# Patient Record
Sex: FEMALE | Race: AMERICAN INDIAN OR ALASKA NATIVE | NOT HISPANIC OR LATINO | Employment: STUDENT | URBAN - METROPOLITAN AREA
[De-identification: names, ages, dates, MRNs, and addresses within clinical notes are randomized per-mention and may not be internally consistent; named-entity substitution may affect disease eponyms.]

---

## 2023-04-21 ENCOUNTER — TRANSFERRED RECORDS (OUTPATIENT)
Dept: HEALTH INFORMATION MANAGEMENT | Facility: CLINIC | Age: 23
End: 2023-04-21
Payer: COMMERCIAL

## 2023-04-24 ENCOUNTER — TELEPHONE (OUTPATIENT)
Dept: OBGYN | Facility: CLINIC | Age: 23
End: 2023-04-24
Payer: COMMERCIAL

## 2023-04-24 DIAGNOSIS — Z33.2 LEGAL TERMINATION OF PREGNANCY: Primary | ICD-10-CM

## 2023-04-24 NOTE — TELEPHONE ENCOUNTER
"Called and spoke with patient regarding termination referral. Patient confirmed she is seeking a D&E. Voiced concern and anxiety over process, as past terminations were mentally, emotionally and physically traumatizing.     Patient's first termination was around 23 weeks pregnant, and she did not know she was pregnant. She states she felt everything for both parts of her procedure, the laminaria placement and D&E procedure itself. In 2018, she found out she was pregnant again and elected to proceed with a medication termination to avoid the trauma from her first experience. The medication termination was complicated by \"vomiting, chills, severe pain where [she] became unconscious multiple times and large blood loss that resulted in [her] being evaluated emergently in an ED.\" Patient states her experience in the ED was traumatizing as the staff were prejudice towards her (racially and for pursuing an ) and made her feel as if she deserved the complications that were happening to her.    Validated patient's fears and anxieties and informed patient that we will work to make this a therpauetic experience for her. Also informed patient that staff here are supportive of women's health and reproductive rights. Patient thankfull and appreciative of clinic's support.     Patient had questions regarding financial coverage. Informed patient that an insurance check will be generated by the clinic, and if not covered, a cost estimate will be created. Informed patient that financial resources are available and will be provided to her, to contact, once the  has completed check insurance.     Answered remaining questions. Provided patient with clinic's emergency line for any questions/concerns/requests.     E-mail sent to [facility] to verify insurance coverage.  "

## 2023-04-24 NOTE — TELEPHONE ENCOUNTER
Referral received from [facility] for D&E.  She is being referred to [facility] because she requires additional sedation than what can be given at [facility]  Gestational age 14+6  DAMIAN 10/14/23  Medical records have been received    An email has been sent to [facility]/financial counselor to verify insurance

## 2023-04-24 NOTE — LETTER
Day of Dilators: Wednesday, May 10th    Your dilators are scheduled at the [facility] at [address].  You can park at the [address], take the skyway across to the building, and take the elevator to the third floor.     You are scheduled at 2:30pm.  Please arrive early at 2:15pm and check in with the .      You will not be sedated this day, so you are able to drive yourself, however many people find it helpful to have a support person and .  You may take 600-800mg of ibuprofen one hour prior to this appointment with food to help ease cramping.  Your cervix will be injected with a numbing medication before the dilators are placed.    After the insertion procedure, you will head to the pharmacy on the 2nd floor of the same building to  two prescriptions: Flagyl and Cytotec.  Flagyl is an antibiotic- please take it with dinner the night before surgery.  The Cytotec you will need the day of surgery.     You will also be given a special soap called Hibiclens.  Please wash your body with this soap the night before and the morning of surgery.  This helps reduce the bacterial load in the operating room.   If you wash and condition your hair, please wash your body with the soap AFTER rinsing your hair.     You will be given a phone number to call if you have any concerns on the night between dilator placement and surgery.     Day of Surgery: Thursday, May 11th    Your surgery is scheduled at [facility].  [facility].  [address] [place] [address].  If parking is needed please park in the [address].  If you are unsure how to get to the hospital - search [employer] maps for [facility] [place].    Just stop at the  and security will tell you where you need to go for your surgery.    Your surgery is scheduled at 1:45pm.  Arrive at the hospital 2 hours before your surgery at 11:45am.    Please do not eat or drink  after 3:45am the day of surgery.  You may have sips of clear liquids (water, black coffee, Gatorade) up to 9:45am.  If you have prescription medications that you take everyday, you can take those with a sip of water.     Cytotec is the other medication you picked up from the pharmacy.  This medication should be placed between your cheek and gum in your lower jaw, 2 hours before surgery.  You can place it in your cheeks as you arrive to the hospital.    A responsible adult (18 years or older) will need to drive you home after surgery and stay with you for 24 hours.

## 2023-04-24 NOTE — TELEPHONE ENCOUNTER
"E-mail response from Shikha:     \"Upon checking on this patients plan document on [employer], this is a non-covered for elective.  Below states that only covers the safeguard the life of the mother.    The following exclusions and limitations apply to Expenses Incurred by all Covered Persons:    1. .    Charges for  unless Medically Necessary to safeguard the life of the mother. This Plan does cover treatment of complications that arise after an , whether or not the  was Medically Necessary.\".     Cost of care estimate generated.       "

## 2023-04-25 DIAGNOSIS — Z33.2 TERMINATION OF PREGNANCY (FETUS): Primary | ICD-10-CM

## 2023-04-25 RX ORDER — ACETAMINOPHEN 325 MG/1
975 TABLET ORAL ONCE
Status: CANCELLED | OUTPATIENT
Start: 2023-04-25 | End: 2023-04-25

## 2023-04-25 NOTE — PROGRESS NOTES
Referral from [facility]. Donna Spain is a  at 15w3d with DAMIAN of 10/14/2023 by 14w6d US referred to [facility] for termination due to GA.    Women's Right to Know completion date: N/A    History of vaginal deliveries/ deliveries: None    Blood type: Unknown    Mifepristone: no    Misoprostol: yes    Laminaria: yes: if procedure done > 16 weeks     Chromosome testing: no    Remains: hospital disposition    Desires memento box/footprints: no    Gricel Teixeira MD

## 2023-04-28 NOTE — TELEPHONE ENCOUNTER
Received call from Syl at [facility].  Per Syl, patient had reached out and made an appointment with them in a few days.  She has been to [facility] before and has trauma from previous terminations, which is why she was referred to [facility].  Syl believes the procedure will be safer/better at [facility] for Donna, given her trauma and anxiety.      Syl stated she would call Donna this morning and encourage her to reach out to the financial resources she was given and move forward with termination at [facility].     I stated I would call her this afternoon to check in.

## 2023-05-01 ENCOUNTER — HOSPITAL ENCOUNTER (OUTPATIENT)
Facility: CLINIC | Age: 23
End: 2023-05-01
Attending: OBSTETRICS & GYNECOLOGY | Admitting: OBSTETRICS & GYNECOLOGY
Payer: COMMERCIAL

## 2023-05-01 NOTE — TELEPHONE ENCOUNTER
Called Donna to check in re: funding.     She is scheduled with [facility] on , and plans to keep this appointment in case she does not receive more funding.     [employer] has pledged $1,500.  She has calls out to [employer], who offered $400.  Is waiting to hear back from [employer].      Discussed that she is scheduled with  5/10 and .      Plan is for me to call Donna tomorrow and Wednesday afternoons to check in with her.  If she does not receive adequate funding by Wednesday afternoon, she will cancel with us and go to [facility].      I asked if she will be okay with this, given her previous trauma, and she stated she would rather go through this procedure at [facility] than bring a baby into the world that she cannot afford.

## 2023-05-03 NOTE — TELEPHONE ENCOUNTER
Called Donna to check in.      She has spoken with Syl from [facility].  They have postponed her appointment there until she sees if she has enough funding to pursue with us.  If not, she will use [facility] as Plan B.     She has called 10 different places for funding.  [employer] is pledging $1,500.    [employer] is pledging $1,000.    [facility]  [facility]  Fund  [place]   NAF: Is awaiting call from     Sent email to Anup Ramirez.     Donna declines social work involvement at this time, does not want mementoes, and wants the hospital to handle remains.  Sent FYI to social work.     Discussed what to expect for both days.  Donna has had dilators placed before and states it was painful for her in the past.  Encouraged her to take 800mg ibuprofen prior. Generated and sent letter with all instructions.      Plan is to call Donna on Monday to check in re: funding.

## 2023-05-05 NOTE — TELEPHONE ENCOUNTER
Received a call from [employer] in [place], [place], which is a volunteer-staffed fund.  They are pledging $1,000 for Donna's procedure.  They would like an invoice sent to them at [email or web address].  Nathalie can be called with questions at [phone].      Email sent to Anup Ramirez.

## 2023-05-08 ENCOUNTER — MYC MEDICAL ADVICE (OUTPATIENT)
Dept: SURGERY | Facility: CLINIC | Age: 23
End: 2023-05-08
Payer: COMMERCIAL

## 2023-05-08 RX ORDER — METRONIDAZOLE 500 MG/1
500 TABLET ORAL ONCE
Qty: 1 TABLET | Refills: 0 | Status: SHIPPED | OUTPATIENT
Start: 2023-05-08 | End: 2023-05-08

## 2023-05-08 RX ORDER — MISOPROSTOL 200 UG/1
TABLET ORAL
Qty: 2 TABLET | Refills: 0 | Status: SHIPPED | OUTPATIENT
Start: 2023-05-08

## 2023-05-08 NOTE — TELEPHONE ENCOUNTER
Contacted Donna.  She states that she has secured funding for full cost of the procedure.  Medications sent to [facility].    email to Anup to confirm coverage

## 2023-05-10 ENCOUNTER — ALLIED HEALTH/NURSE VISIT (OUTPATIENT)
Dept: OBGYN | Facility: CLINIC | Age: 23
End: 2023-05-10
Attending: OBSTETRICS & GYNECOLOGY
Payer: COMMERCIAL

## 2023-05-10 VITALS
WEIGHT: 102 LBS | SYSTOLIC BLOOD PRESSURE: 126 MMHG | DIASTOLIC BLOOD PRESSURE: 80 MMHG | HEART RATE: 118 BPM | TEMPERATURE: 98.9 F

## 2023-05-10 DIAGNOSIS — Z33.2 TERMINATION OF PREGNANCY (FETUS): Primary | ICD-10-CM

## 2023-05-10 DIAGNOSIS — Z33.2 LEGAL TERMINATION OF PREGNANCY: Primary | ICD-10-CM

## 2023-05-10 PROCEDURE — G0463 HOSPITAL OUTPT CLINIC VISIT: HCPCS | Performed by: OBSTETRICS & GYNECOLOGY

## 2023-05-10 PROCEDURE — 99203 OFFICE O/P NEW LOW 30 MIN: CPT | Performed by: OBSTETRICS & GYNECOLOGY

## 2023-05-10 RX ORDER — HYDROMORPHONE HYDROCHLORIDE 1 MG/ML
0.2 INJECTION, SOLUTION INTRAMUSCULAR; INTRAVENOUS; SUBCUTANEOUS EVERY 5 MIN PRN
Status: CANCELLED | OUTPATIENT
Start: 2023-05-10

## 2023-05-10 RX ORDER — METRONIDAZOLE 500 MG/1
500 TABLET ORAL ONCE
Qty: 1 TABLET | Refills: 0 | Status: SHIPPED | OUTPATIENT
Start: 2023-05-10 | End: 2023-05-10

## 2023-05-10 RX ORDER — ONDANSETRON 2 MG/ML
4 INJECTION INTRAMUSCULAR; INTRAVENOUS EVERY 30 MIN PRN
Status: CANCELLED | OUTPATIENT
Start: 2023-05-10

## 2023-05-10 RX ORDER — SODIUM CHLORIDE, SODIUM LACTATE, POTASSIUM CHLORIDE, CALCIUM CHLORIDE 600; 310; 30; 20 MG/100ML; MG/100ML; MG/100ML; MG/100ML
INJECTION, SOLUTION INTRAVENOUS CONTINUOUS
Status: CANCELLED | OUTPATIENT
Start: 2023-05-10

## 2023-05-10 RX ORDER — MIFEPRISTONE 200 MG/1
200 TABLET ORAL ONCE
Status: CANCELLED | OUTPATIENT
Start: 2023-05-10 | End: 2023-05-10

## 2023-05-10 RX ORDER — FENTANYL CITRATE 50 UG/ML
50 INJECTION, SOLUTION INTRAMUSCULAR; INTRAVENOUS EVERY 5 MIN PRN
Status: CANCELLED | OUTPATIENT
Start: 2023-05-10

## 2023-05-10 RX ORDER — FENTANYL CITRATE 50 UG/ML
25 INJECTION, SOLUTION INTRAMUSCULAR; INTRAVENOUS EVERY 5 MIN PRN
Status: CANCELLED | OUTPATIENT
Start: 2023-05-10

## 2023-05-10 RX ORDER — HYDROMORPHONE HYDROCHLORIDE 1 MG/ML
0.4 INJECTION, SOLUTION INTRAMUSCULAR; INTRAVENOUS; SUBCUTANEOUS EVERY 5 MIN PRN
Status: CANCELLED | OUTPATIENT
Start: 2023-05-10

## 2023-05-10 RX ORDER — ONDANSETRON 4 MG/1
4 TABLET, ORALLY DISINTEGRATING ORAL EVERY 30 MIN PRN
Status: CANCELLED | OUTPATIENT
Start: 2023-05-10

## 2023-05-10 ASSESSMENT — ANXIETY QUESTIONNAIRES
5. BEING SO RESTLESS THAT IT IS HARD TO SIT STILL: NOT AT ALL
7. FEELING AFRAID AS IF SOMETHING AWFUL MIGHT HAPPEN: NOT AT ALL
GAD7 TOTAL SCORE: 1
2. NOT BEING ABLE TO STOP OR CONTROL WORRYING: NOT AT ALL
GAD7 TOTAL SCORE: 1
1. FEELING NERVOUS, ANXIOUS, OR ON EDGE: SEVERAL DAYS
6. BECOMING EASILY ANNOYED OR IRRITABLE: NOT AT ALL
3. WORRYING TOO MUCH ABOUT DIFFERENT THINGS: NOT AT ALL

## 2023-05-10 ASSESSMENT — PATIENT HEALTH QUESTIONNAIRE - PHQ9
SUM OF ALL RESPONSES TO PHQ QUESTIONS 1-9: 1
5. POOR APPETITE OR OVEREATING: NOT AT ALL

## 2023-05-10 NOTE — PROGRESS NOTES
Patient and partner/support person, Junterri, present to clinic for laminaria placement.  Tavian stayed in the waiting room. BP and temp normal, tachycardic at 118. Discussed laminaria procedure and NPO/clear liquid status, instructions for arrival to hospital, use of antibacterial body wash, and prescribed medications. Gave patient post procedure and post surgery information. Reviewed emergency instructions and phone number. All questions answered. Consented for procedure by Dr. Moses, See provider note. Mifepristone not needed.     Donna stated, when she came in, that she has been having contraction-like pain today and an urge to push, like she needs to have a bowel movement, as well as wet underwear.  She states she took misoprostal 400mcg at [facility] on 4/21 and felt an urge to push and had some spotting.     Dr. Moses placed a speculum, and patient was unable to tolerate procedure thereafter.  I attempted to re-schedule her for LAMs tomorrow and D+E Friday, but no surgeons available for Friday.  I told her our surgery scheduler would reach out tomorrow to discuss options.

## 2023-05-10 NOTE — PROGRESS NOTES
23yo  at 17+5 here for laminaria insertion and preop H and P for  D and E tomorrow.   Reviewed expectations, options, questions.   Having lots of pressure.     Past Surgical History:   Procedure Laterality Date     INDUCED       times 2, bled heavy with 7 week procedure     OB History    Para Term  AB Living   3 0 0 0 2 0   SAB IAB Ectopic Multiple Live Births   0 0 0 0 0      # Outcome Date GA Lbr Remberto/2nd Weight Sex Delivery Anes PTL Lv   3             2 AB            1 AB                  Appears well  Lungs CTA bilaterally  CV RRR  Abdomen  EG wnl  Cervix nulliparous external os appears closed   Bimanual c/w stated GA  LE without significant edema nor erythema    Labs pending (ABO, CBC)  Ul[facility]ound reviewed [facility] for dating    Procedure:  After affirmation of consent was signed the patient was positioned  The cervix was visualized with a Henry speculum   Patient could not tolerate the procedure from that point and requested singleton insertion under anesthesia.   EBL none    A/P:  Will work on rescheduling.     Denisa Moses MD

## 2023-05-10 NOTE — TELEPHONE ENCOUNTER
Received call from patient. Patient reports she cannot come to appointment today unless she knows cost of mifepristone, misoprostol, and flagyl as it need to be under $100. Patient reports she needs to know by 11:00 as that is when she needs to leave for her appointment, and is inquiring about alternative dates if she cannot come today. E-mail sent to financial team, spoke with [employer] team and [facility].     Costs given:  Mifepristone: $64.60 before insurance  Total for cytotec and flagyl together: $7.76    Called patient back to let her know costs. Spoke with Dr. Moses who reports patient does not need mifepristone. Patient stated she struggles financially and she will speak with her boyfriend and call back if she will still come in today. Let patient know that we can look into availability and rescheduling once we know whether or not she can make it to appointment today.

## 2023-05-10 NOTE — LETTER
5/10/2023       RE: Donna Spain  [address] [place] [address]     Dear Colleague,    Thank you for referring your patient, Donna Spain, to the [facility] [place] at [facility]. Please see a copy of my visit note below.    Patient and partner/support person, Junterri, present to clinic for laminaria placement.  Tavian stayed in the waiting room. BP and temp normal, tachycardic at 118. Discussed laminaria procedure and NPO/clear liquid status, instructions for arrival to hospital, use of antibacterial body wash, and prescribed medications. Gave patient post procedure and post surgery information. Reviewed emergency instructions and phone number. All questions answered. Consented for procedure by Dr. Moses, See provider note. Mifepristone not needed.     Donna stated, when she came in, that she has been having contraction-like pain today and an urge to push, like she needs to have a bowel movement, as well as wet underwear.  She states she took misoprostal 400mcg at [facility] on  and felt an urge to push and had some spotting.     Dr. Moses placed a speculum, and patient was unable to tolerate procedure thereafter.  I attempted to re-schedule her for LAMs tomorrow and D+E Friday, but no surgeons available for Friday.  I told her our surgery scheduler would reach out tomorrow to discuss options.       23yo  at 17+5 here for laminaria insertion and preop H and P for  D and E tomorrow.   Reviewed expectations, options, questions.   Having lots of pressure.     Past Surgical History:   Procedure Laterality Date     INDUCED       times 2, bled heavy with 7 week procedure     OB History    Para Term  AB Living   3 0 0 0 2 0   SAB IAB Ectopic Multiple Live Births   0 0 0 0 0      # Outcome Date GA Lbr Remberto/2nd Weight Sex Delivery Anes PTL Lv   3             2 AB            1 AB                  Appears well  Lungs CTA  bilaterally  CV RRR  Abdomen  EG wnl  Cervix nulliparous external os appears closed   Bimanual c/w stated GA  LE without significant edema nor erythema    Labs pending (ABO, CBC)  Ultrasound reviewed from [facility] for dating    Procedure:  After affirmation of consent was signed the patient was positioned  The cervix was visualized with a Henry speculum   Patient could not tolerate the procedure from that point and requested singleton insertion under anesthesia.   EBL none    A/P:  Will work on rescheduling.     Denisa Moses MD            Again, thank you for allowing me to participate in the care of your patient.      Sincerely,    Denisa Moses MD

## 2023-05-11 DIAGNOSIS — Z33.2 LEGAL TERMINATION OF PREGNANCY: Primary | ICD-10-CM

## 2023-05-11 RX ORDER — ACETAMINOPHEN 325 MG/1
975 TABLET ORAL ONCE
Status: CANCELLED | OUTPATIENT
Start: 2023-05-11 | End: 2023-05-11

## 2023-05-11 NOTE — TELEPHONE ENCOUNTER
Donna was unable to tolerate procedure in clinic yesterday.  Will work to coordinate LAMS and D&E in OR.     Attempted to call Donna, but line rings without opportunity to leave voicemail.

## 2023-05-12 NOTE — TELEPHONE ENCOUNTER
Donna called back.  Advised of change to 7th floor group to facilitate procedure in a more timely fashion.  Will reach back out to her when cost of care estimate is available so she can secure funding.

## 2023-05-12 NOTE — TELEPHONE ENCOUNTER
Attempted to call Donna, no answer, unable to leave message.    7th floor [facility] will be taking over coordinating Donna's procedures due to OR/MD availability.    email sent to financial department for new cost of care estimate, and [facility] team added to email chain.

## 2023-05-15 ENCOUNTER — TELEPHONE (OUTPATIENT)
Dept: NURSING | Facility: CLINIC | Age: 23
End: 2023-05-15
Payer: COMMERCIAL

## 2023-05-18 ENCOUNTER — TELEPHONE (OUTPATIENT)
Dept: NURSING | Facility: CLINIC | Age: 23
End: 2023-05-18
Payer: COMMERCIAL

## 2023-05-18 NOTE — TELEPHONE ENCOUNTER
Pt called regarding financial coverage for Laminara procedure and for D&E procedure.  Pt states that she will have monies available once the possible dates are known.  Message routed to surgery scheduling staff.  Pt DAMIAN is 10/14/23.

## 2023-05-19 ENCOUNTER — TELEPHONE (OUTPATIENT)
Dept: OBGYN | Facility: CLINIC | Age: 23
End: 2023-05-19
Payer: COMMERCIAL

## 2023-05-19 ENCOUNTER — HOSPITAL ENCOUNTER (OUTPATIENT)
Facility: CLINIC | Age: 23
End: 2023-05-19
Attending: OBSTETRICS & GYNECOLOGY | Admitting: OBSTETRICS & GYNECOLOGY
Payer: COMMERCIAL

## 2023-05-19 DIAGNOSIS — Z33.2 LEGAL TERMINATION OF PREGNANCY: Primary | ICD-10-CM

## 2023-05-19 NOTE — TELEPHONE ENCOUNTER
Type of surgery: gyn  Location of surgery: [facility] OR  Date and time of surgery: 5/30/23 and 5/31/23  Surgeon: Dr. Gricel Porter and Dr. Gricel Chandra  Pre-Op Appt Date: surgeon  Post-Op Appt Date: n/a   Packet sent out: Yes  Pre-cert/Authorization completed:  Not Applicable  Date: 5/19/2023

## 2023-05-21 ENCOUNTER — HEALTH MAINTENANCE LETTER (OUTPATIENT)
Age: 23
End: 2023-05-21

## 2023-05-26 ENCOUNTER — TELEPHONE (OUTPATIENT)
Dept: NURSING | Facility: CLINIC | Age: 23
End: 2023-05-26
Payer: COMMERCIAL

## 2023-05-26 ENCOUNTER — NURSE TRIAGE (OUTPATIENT)
Dept: NURSING | Facility: CLINIC | Age: 23
End: 2023-05-26
Payer: COMMERCIAL

## 2023-05-26 NOTE — TELEPHONE ENCOUNTER
Triage Call:    Caller: Patient    Pt calling and has an appointment for D&E w/ laminaria removal and is a  [facility] by Dr. Chandra. Needs to be put under sedation for placement for the sticks.     Pt called funding for the placement itself but is having issues with covering anesthesia. No triage needs but connecting Pt to appropriate person to resolve and mediate any questions or issues.         Protocol Recommended Disposition: Information only no triage home care.    Caller verbalized understanding of instructions and questions answered.      Lien Merrill, RN, [place], PHN on 5/26/2023 at 2:26 PM  [facility]  RN utilized sound nursing judgement based on facility triage protocols during this encounter.      Reason for Disposition    Information only question and nurse able to answer     Questions for women's health    Additional Information    Negative: Nursing judgment    Negative: Nursing judgment    Negative: Nursing judgment    Negative: Nursing judgment    Protocols used: INFORMATION ONLY CALL - NO TRIAGE-A-[place]

## 2023-05-26 NOTE — TELEPHONE ENCOUNTER
This writer received a call from the pt at around 2pm stating that she has $[id number] secured for the D&E.  Pt has not been able to secure the additional amount for the Laminaria in the OR.  This writer worked with cooperation with others to try to make a plan for pt.  Pt was unreachable at this time.  [employer] message was sent to the pt.  Plan for pt is to either get laminaira placed in the clinic with Valium and have the D&E in the OR on the following day.  Or both the procedures will need to be cancelled.

## 2023-05-30 DIAGNOSIS — F41.9 ANXIETY DUE TO INVASIVE PROCEDURE: Primary | ICD-10-CM

## 2023-05-30 DIAGNOSIS — R10.9 ABDOMINAL PAIN DURING PREGNANCY IN SECOND TRIMESTER: ICD-10-CM

## 2023-05-30 DIAGNOSIS — O26.892 ABDOMINAL PAIN DURING PREGNANCY IN SECOND TRIMESTER: ICD-10-CM

## 2023-05-30 RX ORDER — ACETAMINOPHEN 500 MG
1000 TABLET ORAL ONCE
Qty: 2 TABLET | Refills: 0 | Status: SHIPPED | OUTPATIENT
Start: 2023-05-30 | End: 2023-05-30

## 2023-05-30 RX ORDER — LORAZEPAM 1 MG/1
1 TABLET ORAL
Qty: 1 TABLET | Refills: 0 | Status: SHIPPED | OUTPATIENT
Start: 2023-05-30

## 2023-05-30 NOTE — TELEPHONE ENCOUNTER
PROCEDURE 05/30/23 CANCELLED, PROCEDURE 05/31/23 MOVED TO DEPOT; NOT ENOUGH FUNDING.     Vanna Iyer/her/hers  [facility] Surgery Scheduler

## 2024-07-28 ENCOUNTER — HEALTH MAINTENANCE LETTER (OUTPATIENT)
Age: 24
End: 2024-07-28

## (undated) RX ORDER — FENTANYL CITRATE-0.9 % NACL/PF 10 MCG/ML
PLASTIC BAG, INJECTION (ML) INTRAVENOUS
Status: DISPENSED
Start: 2023-05-12

## (undated) RX ORDER — ONDANSETRON 2 MG/ML
INJECTION INTRAMUSCULAR; INTRAVENOUS
Status: DISPENSED
Start: 2023-05-12